# Patient Record
Sex: MALE | Race: WHITE
[De-identification: names, ages, dates, MRNs, and addresses within clinical notes are randomized per-mention and may not be internally consistent; named-entity substitution may affect disease eponyms.]

---

## 2024-07-31 ENCOUNTER — APPOINTMENT (OUTPATIENT)
Dept: ORTHOPEDIC SURGERY | Facility: CLINIC | Age: 34
End: 2024-07-31
Payer: COMMERCIAL

## 2024-07-31 DIAGNOSIS — M54.16 RADICULOPATHY, LUMBAR REGION: ICD-10-CM

## 2024-07-31 PROCEDURE — 72110 X-RAY EXAM L-2 SPINE 4/>VWS: CPT

## 2024-07-31 PROCEDURE — 99204 OFFICE O/P NEW MOD 45 MIN: CPT

## 2024-07-31 NOTE — DATA REVIEWED
[FreeTextEntry1] : Obtained reviewed AP lateral extension lumbar x-rays.  Patient is mobile spondylolisthesis at L5-S1 with pars defect.

## 2024-07-31 NOTE — IMAGING
[de-identified] : TTP midline spine and paraspinal musculature  Strength                                          Hip flexor   Right: 5/5; Left: 5/5                              Knee extensor     Right: 5/5; Left: 5/5                      Ankle dorsiflexion   Right: 5/5; Left: 5/5                   EHL           Right: 5/5; Left: 5/5                                 Ankle plantarflexion       Right: 5/5; Left: 5/5  Sensation L1   Right: 2/2; Left: 2/2 L2   Right: 2/2; Left: 2/2 L3   Right: 2/2; Left: 2/2 L4   Right: 2/2; Left: 2/2 L5   Right: 2/2; Left: 2/2 S1   Right: 2/2; Left: 2/2  Reflexes Patella   Right: 2+; Left 2+ Achilles   Right: 2+; Left 2+ Clonus  Right: absent; L: absent

## 2024-07-31 NOTE — HISTORY OF PRESENT ILLNESS
[de-identified] : 34-year-old male presents with low back pain for about 3 months.  Sometimes radiates down his legs but progressing his low back.  The patient fell off of a short bunk.  He has intermittent back pain.  He has never had a problem with this before.  No loss of bladder or bowel.  No numbness no tingling.

## 2024-07-31 NOTE — DISCUSSION/SUMMARY
[de-identified] : 34-year-old male presents with lumbar radiculopathy with L5-S1 spondylolisthesis with pars defect.  I am giving patient prescription for physical therapy.  I will see him back in 6 weeks.  If he still having pain I will order an MRI of his lumbar spine.

## 2024-10-14 ENCOUNTER — APPOINTMENT (OUTPATIENT)
Dept: ORTHOPEDIC SURGERY | Facility: CLINIC | Age: 34
End: 2024-10-14